# Patient Record
Sex: FEMALE | Race: BLACK OR AFRICAN AMERICAN | NOT HISPANIC OR LATINO
[De-identification: names, ages, dates, MRNs, and addresses within clinical notes are randomized per-mention and may not be internally consistent; named-entity substitution may affect disease eponyms.]

---

## 2017-05-26 PROBLEM — Z00.00 ENCOUNTER FOR PREVENTIVE HEALTH EXAMINATION: Status: ACTIVE | Noted: 2017-05-26

## 2017-06-01 PROBLEM — Z83.3 FAMILY HISTORY OF DIABETES MELLITUS: Status: ACTIVE | Noted: 2017-06-01

## 2017-06-01 PROBLEM — Z82.49 FAMILY HISTORY OF ESSENTIAL HYPERTENSION: Status: ACTIVE | Noted: 2017-06-01

## 2017-06-01 PROBLEM — Z86.79 HISTORY OF ESSENTIAL HYPERTENSION: Status: RESOLVED | Noted: 2017-06-01 | Resolved: 2017-06-01

## 2017-06-01 PROBLEM — E10.51 TYPE 1 DIABETES, UNCONTROLLED, WITH PERIPHERAL CIRCULATORY DISORDER: Status: RESOLVED | Noted: 2017-06-01 | Resolved: 2017-06-01

## 2017-06-01 RX ORDER — METFORMIN HYDROCHLORIDE 1000 MG/1
1000 TABLET, COATED ORAL
Refills: 0 | Status: ACTIVE | COMMUNITY

## 2017-06-01 RX ORDER — CHOLECALCIFEROL (VITAMIN D3) 50 MCG
50 MCG TABLET ORAL
Refills: 0 | Status: ACTIVE | COMMUNITY

## 2017-06-01 RX ORDER — LISINOPRIL AND HYDROCHLOROTHIAZIDE TABLETS 20; 25 MG/1; MG/1
20-25 TABLET ORAL
Refills: 0 | Status: ACTIVE | COMMUNITY

## 2017-06-01 RX ORDER — PRAVASTATIN SODIUM 40 MG/1
TABLET ORAL
Refills: 0 | Status: ACTIVE | COMMUNITY

## 2017-06-02 ENCOUNTER — APPOINTMENT (OUTPATIENT)
Dept: SURGERY | Facility: CLINIC | Age: 82
End: 2017-06-02

## 2017-06-02 VITALS
HEIGHT: 66 IN | HEART RATE: 89 BPM | WEIGHT: 125 LBS | DIASTOLIC BLOOD PRESSURE: 67 MMHG | SYSTOLIC BLOOD PRESSURE: 233 MMHG | BODY MASS INDEX: 20.09 KG/M2

## 2017-06-02 DIAGNOSIS — Z86.79 PERSONAL HISTORY OF OTHER DISEASES OF THE CIRCULATORY SYSTEM: ICD-10-CM

## 2017-06-02 DIAGNOSIS — Z82.49 FAMILY HISTORY OF ISCHEMIC HEART DISEASE AND OTHER DISEASES OF THE CIRCULATORY SYSTEM: ICD-10-CM

## 2017-06-02 DIAGNOSIS — Z83.3 FAMILY HISTORY OF DIABETES MELLITUS: ICD-10-CM

## 2017-06-02 DIAGNOSIS — E10.65 TYPE 1 DIABETES MELLITUS WITH DIABETIC PERIPHERAL ANGIOPATHY W/OUT GANGRENE: ICD-10-CM

## 2017-06-02 DIAGNOSIS — E10.51 TYPE 1 DIABETES MELLITUS WITH DIABETIC PERIPHERAL ANGIOPATHY W/OUT GANGRENE: ICD-10-CM

## 2017-06-09 ENCOUNTER — OUTPATIENT (OUTPATIENT)
Dept: OUTPATIENT SERVICES | Facility: HOSPITAL | Age: 82
LOS: 1 days | End: 2017-06-09
Payer: MEDICARE

## 2017-06-09 VITALS
DIASTOLIC BLOOD PRESSURE: 80 MMHG | OXYGEN SATURATION: 99 % | RESPIRATION RATE: 16 BRPM | HEIGHT: 63 IN | TEMPERATURE: 98 F | WEIGHT: 119.93 LBS | SYSTOLIC BLOOD PRESSURE: 180 MMHG | HEART RATE: 74 BPM

## 2017-06-09 DIAGNOSIS — E11.9 TYPE 2 DIABETES MELLITUS WITHOUT COMPLICATIONS: ICD-10-CM

## 2017-06-09 DIAGNOSIS — R06.83 SNORING: ICD-10-CM

## 2017-06-09 DIAGNOSIS — I10 ESSENTIAL (PRIMARY) HYPERTENSION: ICD-10-CM

## 2017-06-09 DIAGNOSIS — E04.9 NONTOXIC GOITER, UNSPECIFIED: ICD-10-CM

## 2017-06-09 DIAGNOSIS — Z90.5 ACQUIRED ABSENCE OF KIDNEY: Chronic | ICD-10-CM

## 2017-06-09 LAB
ANTIBODY ID 1_1: SIGNIFICANT CHANGE UP
BLD GP AB SCN SERPL QL: POSITIVE — SIGNIFICANT CHANGE UP
BUN SERPL-MCNC: 18 MG/DL — SIGNIFICANT CHANGE UP (ref 7–23)
CALCIUM SERPL-MCNC: 9.9 MG/DL — SIGNIFICANT CHANGE UP (ref 8.4–10.5)
CHLORIDE SERPL-SCNC: 98 MMOL/L — SIGNIFICANT CHANGE UP (ref 98–107)
CO2 SERPL-SCNC: 28 MMOL/L — SIGNIFICANT CHANGE UP (ref 22–31)
CREAT SERPL-MCNC: 1.19 MG/DL — SIGNIFICANT CHANGE UP (ref 0.5–1.3)
DAT POLY-SP REAG RBC QL: NEGATIVE — SIGNIFICANT CHANGE UP
GLUCOSE SERPL-MCNC: 101 MG/DL — HIGH (ref 70–99)
HBA1C BLD-MCNC: 7.8 % — HIGH (ref 4–5.6)
HCT VFR BLD CALC: 39.9 % — SIGNIFICANT CHANGE UP (ref 34.5–45)
HGB BLD-MCNC: 12.6 G/DL — SIGNIFICANT CHANGE UP (ref 11.5–15.5)
MCHC RBC-ENTMCNC: 31.6 % — LOW (ref 32–36)
MCHC RBC-ENTMCNC: 31.8 PG — SIGNIFICANT CHANGE UP (ref 27–34)
MCV RBC AUTO: 100.8 FL — HIGH (ref 80–100)
PLATELET # BLD AUTO: 253 K/UL — SIGNIFICANT CHANGE UP (ref 150–400)
PMV BLD: 11.7 FL — SIGNIFICANT CHANGE UP (ref 7–13)
POTASSIUM SERPL-MCNC: 4.3 MMOL/L — SIGNIFICANT CHANGE UP (ref 3.5–5.3)
POTASSIUM SERPL-SCNC: 4.3 MMOL/L — SIGNIFICANT CHANGE UP (ref 3.5–5.3)
RBC # BLD: 3.96 M/UL — SIGNIFICANT CHANGE UP (ref 3.8–5.2)
RBC # FLD: 15.2 % — HIGH (ref 10.3–14.5)
RH IG SCN BLD-IMP: POSITIVE — SIGNIFICANT CHANGE UP
SODIUM SERPL-SCNC: 140 MMOL/L — SIGNIFICANT CHANGE UP (ref 135–145)
WBC # BLD: 7.38 K/UL — SIGNIFICANT CHANGE UP (ref 3.8–10.5)
WBC # FLD AUTO: 7.38 K/UL — SIGNIFICANT CHANGE UP (ref 3.8–10.5)

## 2017-06-09 PROCEDURE — 86077 PHYS BLOOD BANK SERV XMATCH: CPT

## 2017-06-09 PROCEDURE — 93010 ELECTROCARDIOGRAM REPORT: CPT

## 2017-06-09 NOTE — H&P PST ADULT - NEGATIVE ALLERGY TYPES
no indoor environmental allergies/no reactions to food/no outdoor environmental allergies/no reactions to medicines

## 2017-06-09 NOTE — H&P PST ADULT - NEGATIVE ENMT SYMPTOMS
no post-nasal discharge/no nasal discharge/no ear pain/no vertigo/no nasal obstruction/no tinnitus/no sinus symptoms/no nasal congestion/no hearing difficulty no post-nasal discharge/no dry mouth/no hearing difficulty/no nasal discharge/no nasal obstruction/no sinus symptoms/no nasal congestion/no vertigo/no tinnitus/no ear pain/no throat pain

## 2017-06-09 NOTE — H&P PST ADULT - NEGATIVE MUSCULOSKELETAL SYMPTOMS
no back pain/no neck pain/no muscle weakness/no stiffness/no muscle cramps no neck pain/no back pain/no joint swelling/no muscle weakness/no stiffness/no muscle cramps

## 2017-06-09 NOTE — H&P PST ADULT - NEGATIVE OPHTHALMOLOGIC SYMPTOMS
no diplopia/no blurred vision R/no lacrimation R/no blurred vision L/no lacrimation L/no photophobia

## 2017-06-09 NOTE — H&P PST ADULT - PROBLEM SELECTOR PLAN 2
HbgA1C sent, pending result. Blood glucose - Accuchek ordered stat on admit. Instructed to hold metformin & glipizide PM 6/13/2017 & AM of surgery. Verbalized understanding.

## 2017-06-09 NOTE — H&P PST ADULT - NEGATIVE GENERAL GENITOURINARY SYMPTOMS
no dysuria/no bladder infections/no incontinence/no hematuria/no flank pain R/normal urinary frequency/no flank pain L

## 2017-06-09 NOTE — H&P PST ADULT - PROBLEM SELECTOR PLAN 1
Scheduled for resection total substernal thyroidectomy possible tracheostomy on 6/14/2017. labs done and results pending. Surgical scrub & preop instruction given and explained. Famotidine provided with instruction. Verbalized understanding.

## 2017-06-09 NOTE — H&P PST ADULT - HISTORY OF PRESENT ILLNESS
86 yo female with hx of HTN, DM presents to have PST evaluation for resection total substernal thyroidectomy possible tracheostomy on 6/14/2017. Patient reports hx of goiter x 10 years, which increased in size, causing difficulty swallowing, went for an evaluation, had US done. Patient was sent for further evaluation, seen by Dr. Collado, surgical intervention was recommended. 86 yo female with hx of HTN, DM, kidney disease, s/p left nephrectomy (> 20 years ago), presents to have PST evaluation for resection total substernal thyroidectomy possible tracheostomy on 6/14/2017. Patient reports hx of goiter x 10 years, which increased in size, causing difficulty swallowing, went for an evaluation, had US & CT scan done, which revealed "tracheal deviation". Patient was sent for further evaluation, seen by Dr. Collado, surgical intervention was recommended.

## 2017-06-09 NOTE — H&P PST ADULT - RS GEN PE MLT RESP DETAILS PC
no wheezes/no rhonchi/respirations non-labored/breath sounds equal/good air movement/no rales/airway patent

## 2017-06-09 NOTE — H&P PST ADULT - NSANTHOSAYNRD_GEN_A_CORE
No. DOMINIC screening performed.  STOP BANG Legend: 0-2 = LOW Risk; 3-4 = INTERMEDIATE Risk; 5-8 = HIGH Risk

## 2017-06-09 NOTE — H&P PST ADULT - PROBLEM SELECTOR PLAN 3
Instructed to take losartan-HCTZ AM of surgery with a sip of water. Patient presents with BP ranging from 180/80 to 155/100 while at PST. Patient denies any headache, blurred vision or discomfort. Patient states, she didn't take her bp medicine this AM. Instructed to repeat bp after take medicine- present to PCP if remain elevated or have symtoms of HTN. Patient & Faith (granddaughter) verbalized understanding.   Patient states, she's evaluated by PCP for medical clearance on 6/6/17. Will obtain. Will also obtain comparison EKG & last echocardiogram report. Instructed to take losartan-HCTZ AM of surgery with a sip of water. Patient presents with BP ranging from 180/80 to 155/100 while at PST. Patient denies any headache, blurred vision or discomfort. Patient states, she didn't take her bp medicine this AM. Instructed to repeat bp after take medicine- present to PCP if bp remains elevated or have symptoms of HTN. Patient & Faith (granddaughter) verbalized understanding.   Patient states, she's evaluated by PCP for medical clearance on 6/6/17. Will obtain. Will also obtain comparison EKG. Instructed to take losartan-HCTZ AM of surgery with a sip of water. Patient presents with BP ranging from 180/80 to 155/100 while at PST. Patient denies any headache, blurred vision or discomfort. Patient states, she didn't take her bp medicine this AM. Instructed to repeat bp after take medicine- present to PCP if bp remains elevated or have symptoms of HTN. Patient & Faith (granddaughter) verbalized understanding.   Patient states, she's evaluated by PCP for medical clearance on 6/6/17. Will obtain. Will also obtain comparison EKG & recent echocardiogram report.

## 2017-06-09 NOTE — H&P PST ADULT - ENDOCRINE COMMENTS
monitor glucose x2 /week range 120- 130, hx of goiter monitor glucose x2 /week range 120- 130, hx of goiter/ hx of goiter

## 2017-06-14 ENCOUNTER — INPATIENT (INPATIENT)
Facility: HOSPITAL | Age: 82
LOS: 0 days | Discharge: HOME | End: 2017-06-14
Attending: SURGERY | Admitting: SURGERY

## 2017-06-14 VITALS
TEMPERATURE: 99 F | RESPIRATION RATE: 16 BRPM | HEART RATE: 77 BPM | OXYGEN SATURATION: 98 % | DIASTOLIC BLOOD PRESSURE: 85 MMHG | HEIGHT: 63 IN | SYSTOLIC BLOOD PRESSURE: 196 MMHG | WEIGHT: 119.93 LBS

## 2017-06-14 DIAGNOSIS — E04.9 NONTOXIC GOITER, UNSPECIFIED: ICD-10-CM

## 2017-06-14 DIAGNOSIS — Z90.5 ACQUIRED ABSENCE OF KIDNEY: Chronic | ICD-10-CM

## 2017-06-14 RX ORDER — METFORMIN HYDROCHLORIDE 850 MG/1
1 TABLET ORAL
Qty: 0 | Refills: 0 | COMMUNITY

## 2017-06-14 RX ORDER — SODIUM CHLORIDE 9 MG/ML
1000 INJECTION, SOLUTION INTRAVENOUS
Qty: 0 | Refills: 0 | Status: DISCONTINUED | OUTPATIENT
Start: 2017-06-14 | End: 2017-06-14

## 2017-06-14 NOTE — ASU DISCHARGE PLAN (ADULT/PEDIATRIC). - MEDICATION SUMMARY - MEDICATIONS TO TAKE
I will START or STAY ON the medications listed below when I get home from the hospital:    metFORMIN 500 mg oral tablet  -- 1 tab(s) by mouth 2 times a day  -- Indication: For DM    glipiZIDE 5 mg oral tablet, extended release  --  by mouth 2 times a day  -- Indication: For DM    pravastatin 20 mg oral tablet  -- 1 tab(s) by mouth once a day  -- Indication: For HLD    lisinopril-hydrochlorothiazide 20mg-25mg oral tablet  -- 1 tab(s) by mouth once a day  -- Indication: For HTN

## 2017-06-14 NOTE — ASU DISCHARGE PLAN (ADULT/PEDIATRIC). - INSTRUCTIONS
Dr. Cox's office will contact you today regarding your follow up appointment.    Please keep your follow up appointment to reschedule your surgery.

## 2017-06-14 NOTE — PROGRESS NOTE ADULT - SUBJECTIVE AND OBJECTIVE BOX
The patient's planned total thyroidectomy, possible tracheostomy was cancelled after discussion between Dr. Acosta of anesthesiology and Dr. Collado.  The patient requires cardiology evaluation with TTE and other testing as indicated.  She also requires improved blood pressure control.  Dr. Gen Cox 	[phone] was contacted and he accepted the consultation for outpatient work up and will be contacting the family today to arrange a time to be seen.      Ms. Irwin will be rescheduled after cardiology evaluation is conducted.     Discussed with Dr. Collado and the patient and family.

## 2017-06-14 NOTE — ASU DISCHARGE PLAN (ADULT/PEDIATRIC). - SPECIAL INSTRUCTIONS
Return to your previous home diet and activity.  Continue your medications as prescribed until discussing with Dr. Cox.

## 2017-07-05 RX ORDER — SODIUM CHLORIDE 9 MG/ML
1000 INJECTION, SOLUTION INTRAVENOUS
Qty: 0 | Refills: 0 | Status: DISCONTINUED | OUTPATIENT
Start: 2017-07-06 | End: 2017-07-06

## 2017-07-06 ENCOUNTER — APPOINTMENT (OUTPATIENT)
Dept: SURGERY | Facility: HOSPITAL | Age: 82
End: 2017-07-06

## 2017-07-06 ENCOUNTER — RESULT REVIEW (OUTPATIENT)
Age: 82
End: 2017-07-06

## 2017-07-06 ENCOUNTER — INPATIENT (INPATIENT)
Facility: HOSPITAL | Age: 82
LOS: 0 days | Discharge: HOME | End: 2017-07-07
Attending: SURGERY | Admitting: SURGERY
Payer: MEDICARE

## 2017-07-06 ENCOUNTER — TRANSCRIPTION ENCOUNTER (OUTPATIENT)
Age: 82
End: 2017-07-06

## 2017-07-06 VITALS
WEIGHT: 119.93 LBS | HEIGHT: 63 IN | TEMPERATURE: 98 F | HEART RATE: 70 BPM | DIASTOLIC BLOOD PRESSURE: 75 MMHG | RESPIRATION RATE: 16 BRPM | OXYGEN SATURATION: 98 % | SYSTOLIC BLOOD PRESSURE: 215 MMHG

## 2017-07-06 DIAGNOSIS — Z90.5 ACQUIRED ABSENCE OF KIDNEY: Chronic | ICD-10-CM

## 2017-07-06 DIAGNOSIS — E04.9 NONTOXIC GOITER, UNSPECIFIED: ICD-10-CM

## 2017-07-06 LAB
ANTIBODY ID 1_1: SIGNIFICANT CHANGE UP
BASE EXCESS BLDA CALC-SCNC: 2 MMOL/L — SIGNIFICANT CHANGE UP
BLD GP AB SCN SERPL QL: POSITIVE — SIGNIFICANT CHANGE UP
CA-I BLDA-SCNC: 1.19 MMOL/L — SIGNIFICANT CHANGE UP (ref 1.15–1.29)
DAT POLY-SP REAG RBC QL: NEGATIVE — SIGNIFICANT CHANGE UP
GLUCOSE BLDA-MCNC: 96 MG/DL — SIGNIFICANT CHANGE UP (ref 70–99)
HCO3 BLDA-SCNC: 26 MMOL/L — SIGNIFICANT CHANGE UP (ref 22–26)
HCT VFR BLDA CALC: 33.2 % — LOW (ref 34.5–46.5)
HGB BLDA-MCNC: 10.8 G/DL — LOW (ref 11.5–15.5)
PCO2 BLDA: 39 MMHG — SIGNIFICANT CHANGE UP (ref 32–48)
PH BLDA: 7.44 PH — SIGNIFICANT CHANGE UP (ref 7.35–7.45)
PO2 BLDA: 336 MMHG — HIGH (ref 83–108)
POTASSIUM BLDA-SCNC: 3.4 MMOL/L — SIGNIFICANT CHANGE UP (ref 3.4–4.5)
RH IG SCN BLD-IMP: POSITIVE — SIGNIFICANT CHANGE UP
SAO2 % BLDA: 100 % — HIGH (ref 95–99)
SODIUM BLDA-SCNC: 138 MMOL/L — SIGNIFICANT CHANGE UP (ref 136–146)

## 2017-07-06 PROCEDURE — 60240 REMOVAL OF THYROID: CPT

## 2017-07-06 PROCEDURE — 88307 TISSUE EXAM BY PATHOLOGIST: CPT | Mod: 26

## 2017-07-06 PROCEDURE — 86077 PHYS BLOOD BANK SERV XMATCH: CPT

## 2017-07-06 RX ORDER — INSULIN LISPRO 100/ML
VIAL (ML) SUBCUTANEOUS AT BEDTIME
Qty: 0 | Refills: 0 | Status: DISCONTINUED | OUTPATIENT
Start: 2017-07-06 | End: 2017-07-07

## 2017-07-06 RX ORDER — SODIUM CHLORIDE 9 MG/ML
1000 INJECTION, SOLUTION INTRAVENOUS
Qty: 0 | Refills: 0 | Status: DISCONTINUED | OUTPATIENT
Start: 2017-07-06 | End: 2017-07-07

## 2017-07-06 RX ORDER — ACETAMINOPHEN 500 MG
650 TABLET ORAL EVERY 6 HOURS
Qty: 0 | Refills: 0 | Status: DISCONTINUED | OUTPATIENT
Start: 2017-07-06 | End: 2017-07-07

## 2017-07-06 RX ORDER — HEPARIN SODIUM 5000 [USP'U]/ML
5000 INJECTION INTRAVENOUS; SUBCUTANEOUS EVERY 8 HOURS
Qty: 0 | Refills: 0 | Status: DISCONTINUED | OUTPATIENT
Start: 2017-07-06 | End: 2017-07-07

## 2017-07-06 RX ORDER — DEXTROSE 50 % IN WATER 50 %
12.5 SYRINGE (ML) INTRAVENOUS ONCE
Qty: 0 | Refills: 0 | Status: DISCONTINUED | OUTPATIENT
Start: 2017-07-06 | End: 2017-07-07

## 2017-07-06 RX ORDER — GLUCAGON INJECTION, SOLUTION 0.5 MG/.1ML
1 INJECTION, SOLUTION SUBCUTANEOUS ONCE
Qty: 0 | Refills: 0 | Status: DISCONTINUED | OUTPATIENT
Start: 2017-07-06 | End: 2017-07-07

## 2017-07-06 RX ORDER — DEXTROSE 50 % IN WATER 50 %
1 SYRINGE (ML) INTRAVENOUS ONCE
Qty: 0 | Refills: 0 | Status: DISCONTINUED | OUTPATIENT
Start: 2017-07-06 | End: 2017-07-07

## 2017-07-06 RX ORDER — LISINOPRIL 2.5 MG/1
20 TABLET ORAL DAILY
Qty: 0 | Refills: 0 | Status: DISCONTINUED | OUTPATIENT
Start: 2017-07-06 | End: 2017-07-07

## 2017-07-06 RX ORDER — OXYCODONE AND ACETAMINOPHEN 5; 325 MG/1; MG/1
1 TABLET ORAL EVERY 6 HOURS
Qty: 0 | Refills: 0 | Status: DISCONTINUED | OUTPATIENT
Start: 2017-07-06 | End: 2017-07-07

## 2017-07-06 RX ORDER — IBUPROFEN 200 MG
600 TABLET ORAL EVERY 6 HOURS
Qty: 0 | Refills: 0 | Status: DISCONTINUED | OUTPATIENT
Start: 2017-07-06 | End: 2017-07-07

## 2017-07-06 RX ORDER — DEXTROSE 50 % IN WATER 50 %
50 SYRINGE (ML) INTRAVENOUS ONCE
Qty: 0 | Refills: 0 | Status: COMPLETED | OUTPATIENT
Start: 2017-07-06 | End: 2017-07-06

## 2017-07-06 RX ORDER — HYDROCHLOROTHIAZIDE 25 MG
25 TABLET ORAL DAILY
Qty: 0 | Refills: 0 | Status: DISCONTINUED | OUTPATIENT
Start: 2017-07-06 | End: 2017-07-07

## 2017-07-06 RX ORDER — DEXTROSE 50 % IN WATER 50 %
25 SYRINGE (ML) INTRAVENOUS ONCE
Qty: 0 | Refills: 0 | Status: DISCONTINUED | OUTPATIENT
Start: 2017-07-06 | End: 2017-07-07

## 2017-07-06 RX ORDER — INSULIN LISPRO 100/ML
VIAL (ML) SUBCUTANEOUS
Qty: 0 | Refills: 0 | Status: DISCONTINUED | OUTPATIENT
Start: 2017-07-06 | End: 2017-07-07

## 2017-07-06 RX ADMIN — SODIUM CHLORIDE 30 MILLILITER(S): 9 INJECTION, SOLUTION INTRAVENOUS at 08:09

## 2017-07-06 RX ADMIN — SODIUM CHLORIDE 75 MILLILITER(S): 9 INJECTION, SOLUTION INTRAVENOUS at 19:11

## 2017-07-06 RX ADMIN — Medication 600 MILLIGRAM(S): at 20:32

## 2017-07-06 RX ADMIN — Medication 50 MILLILITER(S): at 07:55

## 2017-07-06 RX ADMIN — HEPARIN SODIUM 5000 UNIT(S): 5000 INJECTION INTRAVENOUS; SUBCUTANEOUS at 22:09

## 2017-07-06 RX ADMIN — Medication: at 19:18

## 2017-07-06 RX ADMIN — Medication 600 MILLIGRAM(S): at 20:02

## 2017-07-06 NOTE — CHART NOTE - NSCHARTNOTEFT_GEN_A_CORE
Post-Op Check    S: Patient resting in bed. Pain well controlled. No acute events    T(C): 36.6 (07-06-17 @ 15:00), Max: 36.9 (07-06-17 @ 13:05)  HR: 68 (07-06-17 @ 16:00) (62 - 70)  BP: 159/54 (07-06-17 @ 16:00) (131/45 - 215/75)  RR: 23 (07-06-17 @ 16:00) (14 - 23)  SpO2: 100% (07-06-17 @ 16:00) (98% - 100%)  Wt(kg): --    07-06 @ 07:01  -  07-06 @ 19:37  --------------------------------------------------------  IN:    Oral Fluid: 120 mL    Solution: 225 mL  Total IN: 345 mL    OUT:    Voided: 600 mL  Total OUT: 600 mL    Total NET: -255 mL    CAPILLARY BLOOD GLUCOSE  145 (06 Jul 2017 13:15)  138 (06 Jul 2017 08:16)  50 (06 Jul 2017 06:58)      General: WN/WD NAD  Neurology: A&Ox3, follows commands  Eyes: EOMI  ENT/Neck: No JVD, incision dressing covered with dressing with minimal sanguinous stain  Respiratory: CTA B/L, No wheezing, rales, rhonchi  CV: Normal rate regular rhythm, S1S2, 2/6 systolic ejection murmur best heard at apex  Abdominal: Soft, NT, ND +BS,

## 2017-07-06 NOTE — PROVIDER CONTACT NOTE (OTHER) - RECOMMENDATIONS
paged anesthesia dr. harris, asked to call team spoke to general surgery dr. austin one amp of d 50 ordered

## 2017-07-06 NOTE — DOWNTIME INTERRUPTION NOTE - WHICH MANUAL FORMS INITIATED?
Vital Signs & pain management flowsheet Vital Signs & pain management flowsheet  Assessment & Intervention

## 2017-07-07 ENCOUNTER — TRANSCRIPTION ENCOUNTER (OUTPATIENT)
Age: 82
End: 2017-07-07

## 2017-07-07 VITALS
OXYGEN SATURATION: 100 % | TEMPERATURE: 98 F | DIASTOLIC BLOOD PRESSURE: 55 MMHG | RESPIRATION RATE: 20 BRPM | SYSTOLIC BLOOD PRESSURE: 145 MMHG | HEART RATE: 72 BPM

## 2017-07-07 LAB
BUN SERPL-MCNC: 17 MG/DL — SIGNIFICANT CHANGE UP (ref 7–23)
CA-I BLD-SCNC: 1.14 MMOL/L — SIGNIFICANT CHANGE UP (ref 1.03–1.23)
CALCIUM SERPL-MCNC: 8.6 MG/DL — SIGNIFICANT CHANGE UP (ref 8.4–10.5)
CHLORIDE SERPL-SCNC: 96 MMOL/L — LOW (ref 98–107)
CO2 SERPL-SCNC: 30 MMOL/L — SIGNIFICANT CHANGE UP (ref 22–31)
CREAT SERPL-MCNC: 1.02 MG/DL — SIGNIFICANT CHANGE UP (ref 0.5–1.3)
GLUCOSE SERPL-MCNC: 173 MG/DL — HIGH (ref 70–99)
HCT VFR BLD CALC: 30.7 % — LOW (ref 34.5–45)
HCT VFR BLD CALC: 30.8 % — LOW (ref 34.5–45)
HGB BLD-MCNC: 10.1 G/DL — LOW (ref 11.5–15.5)
HGB BLD-MCNC: 10.2 G/DL — LOW (ref 11.5–15.5)
MAGNESIUM SERPL-MCNC: 1.7 MG/DL — SIGNIFICANT CHANGE UP (ref 1.6–2.6)
MCHC RBC-ENTMCNC: 32.4 PG — SIGNIFICANT CHANGE UP (ref 27–34)
MCHC RBC-ENTMCNC: 32.6 PG — SIGNIFICANT CHANGE UP (ref 27–34)
MCHC RBC-ENTMCNC: 32.9 % — SIGNIFICANT CHANGE UP (ref 32–36)
MCHC RBC-ENTMCNC: 33.1 % — SIGNIFICANT CHANGE UP (ref 32–36)
MCV RBC AUTO: 98.4 FL — SIGNIFICANT CHANGE UP (ref 80–100)
MCV RBC AUTO: 98.4 FL — SIGNIFICANT CHANGE UP (ref 80–100)
NRBC # FLD: 0 — SIGNIFICANT CHANGE UP
NRBC # FLD: 0 — SIGNIFICANT CHANGE UP
PHOSPHATE SERPL-MCNC: 3.7 MG/DL — SIGNIFICANT CHANGE UP (ref 2.5–4.5)
PLATELET # BLD AUTO: 196 K/UL — SIGNIFICANT CHANGE UP (ref 150–400)
PLATELET # BLD AUTO: 199 K/UL — SIGNIFICANT CHANGE UP (ref 150–400)
PMV BLD: 10.8 FL — SIGNIFICANT CHANGE UP (ref 7–13)
PMV BLD: 11.2 FL — SIGNIFICANT CHANGE UP (ref 7–13)
POTASSIUM SERPL-MCNC: 4.6 MMOL/L — SIGNIFICANT CHANGE UP (ref 3.5–5.3)
POTASSIUM SERPL-SCNC: 4.6 MMOL/L — SIGNIFICANT CHANGE UP (ref 3.5–5.3)
RBC # BLD: 3.12 M/UL — LOW (ref 3.8–5.2)
RBC # BLD: 3.13 M/UL — LOW (ref 3.8–5.2)
RBC # FLD: 15 % — HIGH (ref 10.3–14.5)
RBC # FLD: 15.2 % — HIGH (ref 10.3–14.5)
RH IG SCN BLD-IMP: POSITIVE — SIGNIFICANT CHANGE UP
SODIUM SERPL-SCNC: 136 MMOL/L — SIGNIFICANT CHANGE UP (ref 135–145)
WBC # BLD: 9.21 K/UL — SIGNIFICANT CHANGE UP (ref 3.8–10.5)
WBC # BLD: 9.32 K/UL — SIGNIFICANT CHANGE UP (ref 3.8–10.5)
WBC # FLD AUTO: 9.21 K/UL — SIGNIFICANT CHANGE UP (ref 3.8–10.5)
WBC # FLD AUTO: 9.32 K/UL — SIGNIFICANT CHANGE UP (ref 3.8–10.5)

## 2017-07-07 RX ORDER — IBUPROFEN 200 MG
1 TABLET ORAL
Qty: 0 | Refills: 0 | COMMUNITY
Start: 2017-07-07

## 2017-07-07 RX ORDER — ACETAMINOPHEN 500 MG
2 TABLET ORAL
Qty: 0 | Refills: 0 | COMMUNITY
Start: 2017-07-07

## 2017-07-07 RX ORDER — MAGNESIUM SULFATE 500 MG/ML
2 VIAL (ML) INJECTION ONCE
Qty: 0 | Refills: 0 | Status: COMPLETED | OUTPATIENT
Start: 2017-07-07 | End: 2017-07-07

## 2017-07-07 RX ORDER — SODIUM CHLORIDE 9 MG/ML
1000 INJECTION, SOLUTION INTRAVENOUS
Qty: 0 | Refills: 0 | Status: DISCONTINUED | OUTPATIENT
Start: 2017-07-07 | End: 2017-07-07

## 2017-07-07 RX ORDER — SODIUM CHLORIDE 9 MG/ML
3 INJECTION INTRAMUSCULAR; INTRAVENOUS; SUBCUTANEOUS EVERY 8 HOURS
Qty: 0 | Refills: 0 | Status: DISCONTINUED | OUTPATIENT
Start: 2017-07-07 | End: 2017-07-07

## 2017-07-07 RX ADMIN — LISINOPRIL 20 MILLIGRAM(S): 2.5 TABLET ORAL at 05:30

## 2017-07-07 RX ADMIN — Medication 50 GRAM(S): at 10:26

## 2017-07-07 RX ADMIN — Medication 25 MILLIGRAM(S): at 05:30

## 2017-07-07 RX ADMIN — HEPARIN SODIUM 5000 UNIT(S): 5000 INJECTION INTRAVENOUS; SUBCUTANEOUS at 05:30

## 2017-07-07 NOTE — PROGRESS NOTE ADULT - SUBJECTIVE AND OBJECTIVE BOX
D Team Progress Note    S: Patient resting in bed. Pain well controlled. Denies sore throat or changes in voice. Denies SOB/CP.     T(C): 37 (07-07-17 @ 05:27), Max: 37 (07-06-17 @ 16:45)  HR: 71 (07-07-17 @ 05:27) (62 - 74)  BP: 124/47 (07-07-17 @ 05:27) (122/46 - 198/72)  RR: 17 (07-07-17 @ 05:27) (14 - 23)  SpO2: 99% (07-07-17 @ 05:27) (98% - 100%)  Wt(kg): --    07-06 @ 07:01  -  07-07 @ 07:00  --------------------------------------------------------  IN:    lactated ringers.: 975 mL    Oral Fluid: 120 mL    Solution: 225 mL  Total IN: 1320 mL    OUT:    Voided: 600 mL  Total OUT: 600 mL    Total NET: 720 mL    CAPILLARY BLOOD GLUCOSE  245 (06 Jul 2017 21:28)  259 (06 Jul 2017 16:45)  145 (06 Jul 2017 13:15)  138 (06 Jul 2017 08:16)      PE:   Gen: AAOX 3, NAD  Neck: Dressing intact, Incision C,D,I, slight tenderness to palpation over incision site.

## 2017-07-07 NOTE — DISCHARGE NOTE ADULT - PATIENT PORTAL LINK FT
“You can access the [employer] Patient Portal, offered by [facility], by registering with the following website: [email or web address]”

## 2017-07-07 NOTE — DISCHARGE NOTE ADULT - INSTRUCTIONS
Shower daily, wash incisions with mild soap and water, pat dry. No lotion , creams, or powder on incisions. Check incisions daily for signs of infection, redness, swelling drainage or temp greater than 101.0, CALL MD. No heavy lifting greater than 10-15 lbs. No driving if taking narcotics or until cleared by MD. Keep all follow up appointments and take all medications as prescribed.

## 2017-07-07 NOTE — DISCHARGE NOTE ADULT - CARE PROVIDER_API CALL
Nilesh Collado), Surgery  [address] [place] [address]  Phone: [phone]  Fax: [phone]

## 2017-07-07 NOTE — DISCHARGE NOTE ADULT - MEDICATION SUMMARY - MEDICATIONS TO TAKE
I will START or STAY ON the medications listed below when I get home from the hospital:    acetaminophen 325 mg oral tablet  -- 2 tab(s) by mouth every 6 hours, As Needed  -- Indication: For pain medication    ibuprofen 600 mg oral tablet  -- 1 tab(s) by mouth every 6 hours, As Needed  -- Indication: For pain medication    metFORMIN 500 mg oral tablet  -- 1 tab(s) by mouth 2 times a day  -- Indication: For DM medication    glipiZIDE 5 mg oral tablet, extended release  --  by mouth 2 times a day  -- Indication: For DM medication    pravastatin 20 mg oral tablet  -- 1 tab(s) by mouth once a day  -- Indication: For cardiovascular medication    lisinopril-hydrochlorothiazide 20mg-25mg oral tablet  -- 1 tab(s) by mouth once a day  -- Indication: For cardiovascular medication

## 2017-07-07 NOTE — PROGRESS NOTE ADULT - ASSESSMENT
A/P: 87yFemale s/p total substernal thyroidectomy  - encourage OOB/IS  - SCD/ DVT PPX  - monitor labs, replete electrolytes as needed  - Pain control  - Reg diet  - Dispo: D/C home    Kyler Roper PGY1

## 2017-07-07 NOTE — DISCHARGE NOTE ADULT - CARE PLAN
Principal Discharge DX:	Goiter  Goal:	s/p left subtotal thyroidectomy  Instructions for follow-up, activity and diet:	WOUND CARE:  please keep incision clean and dry, do not scrub, pat dry when wet  BATHING: Please do not submerge wound underwater. You may shower and/or sponge bathe.  ACTIVITY: No heavy lifting or straining. Otherwise, you may return to your usual level of physical activity. If you are taking narcotic pain medication (such as Percocet) DO NOT drive a car, operate machinery or make important decisions.  DIET: Return to your usual diabetic/cardiac diet.  NOTIFY YOUR SURGEON IF: You have any bleeding that does not stop, any pus draining from your wound(s), any fever (over 100.4 F) or chills, persistent nausea/vomiting, persistent diarrhea, or if your pain is not controlled on your discharge pain medications.  FOLLOW-UP: Please follow up with your primary care physician in one week regarding your hospitalization  Please f/u with Dr Collado as an outpatient, please call  [phone] to schedule appointment  Secondary Diagnosis:	Diabetes mellitus  Instructions for follow-up, activity and diet:	please monitor your sugar levels, continue medications, and f/u with your PCP/Endocrinologist as an outpatient  Secondary Diagnosis:	Hypertension  Instructions for follow-up, activity and diet:	please cont your home medications and f/u with your PCP/Cardiologist as an outpatient Principal Discharge DX:	Goiter  Goal:	s/p left subtotal thyroidectomy  Instructions for follow-up, activity and diet:	WOUND CARE:  please keep incision clean and dry, do not scrub, pat dry when wet  BATHING: Please do not submerge wound underwater. You may shower and/or sponge bathe.  ACTIVITY: No heavy lifting or straining. Otherwise, you may return to your usual level of physical activity. If you are taking narcotic pain medication (such as Percocet) DO NOT drive a car, operate machinery or make important decisions.  DIET: Return to your usual diabetic/cardiac diet.  NOTIFY YOUR SURGEON IF: You have any bleeding that does not stop, any pus draining from your wound(s), any fever (over 100.4 F) or chills, persistent nausea/vomiting, persistent diarrhea, or if your pain is not controlled on your discharge pain medications.  FOLLOW-UP: Please follow up with your primary care physician in one week regarding your hospitalization  Please f/u with Dr Collado as an outpatient, please call  [phone] to schedule appointment  Secondary Diagnosis:	Diabetes mellitus  Instructions for follow-up, activity and diet:	please monitor your sugar levels, continue medications, and f/u with your PCP/Endocrinologist as an outpatient  Secondary Diagnosis:	Hypertension  Instructions for follow-up, activity and diet:	please cont your home medications and f/u with your PCP/Cardiologist as an outpatient Principal Discharge DX:	Goiter  Goal:	s/p left subtotal thyroidectomy  Instructions for follow-up, activity and diet:	WOUND CARE:  please keep incision clean and dry, do not scrub, pat dry when wet  BATHING: Please do not submerge wound underwater. You may shower and/or sponge bathe.  ACTIVITY: No heavy lifting or straining. Otherwise, you may return to your usual level of physical activity. If you are taking narcotic pain medication (such as Percocet) DO NOT drive a car, operate machinery or make important decisions.  DIET: Return to your usual diabetic/cardiac diet.  NOTIFY YOUR SURGEON IF: You have any bleeding that does not stop, any pus draining from your wound(s), any fever (over 100.4 F) or chills, persistent nausea/vomiting, persistent diarrhea, or if your pain is not controlled on your discharge pain medications.  FOLLOW-UP: Please follow up with your primary care physician in one week regarding your hospitalization  Please f/u with Dr Collado as an outpatient, please call  [phone] to schedule appointment  Secondary Diagnosis:	Diabetes mellitus  Instructions for follow-up, activity and diet:	please monitor your sugar levels, continue medications, and f/u with your PCP/Endocrinologist as an outpatient  Secondary Diagnosis:	Hypertension  Instructions for follow-up, activity and diet:	please cont your home medications and f/u with your PCP/Cardiologist as an outpatient

## 2017-07-07 NOTE — DISCHARGE NOTE ADULT - HOSPITAL COURSE
88 yo female with hx of HTN, DM, kidney disease, s/p left nephrectomy (> 20 years ago). Pt reports hx of goiter x 10 years, which increased in size, causing difficulty swallowing, went for an evaluation, had US & CT scan done, which revealed "tracheal deviation". Patient was sent for further evaluation, seen by Dr. Collado, surgical intervention was recommended. 86 yo female with hx of HTN, DM, kidney disease, s/p left nephrectomy (> 20 years ago). Pt reports hx of goiter x 10 years, which increased in size, causing difficulty swallowing, went for an evaluation, had US & CT scan done, which revealed "tracheal deviation". Patient was sent for further evaluation, seen by Dr. Collado, surgical intervention was recommended.     6/24 pt arrived for scheduled total thyroidectomy, possible tracheostomy but case was cancelled due to need for cardiology evaluation. Pt was discharged and followed up with her Cardiologist for optimization prior to the OR.    7/6 pt same day admission and underwent left subtotal thyroidectomy. Pt tolerated procedure well.    Post operative course uncomplicated. Pt's diet advanced as tolerated. 88 yo female with hx of HTN, DM, kidney disease, s/p left nephrectomy (> 20 years ago). Pt reports hx of goiter x 10 years, which increased in size, causing difficulty swallowing, went for an evaluation, had US & CT scan done, which revealed "tracheal deviation". Patient was sent for further evaluation, seen by Dr. Collado, surgical intervention was recommended.     6/24 pt arrived for scheduled total thyroidectomy, possible tracheostomy but case was cancelled due to need for cardiology evaluation. Pt was discharged and followed up with her Cardiologist for optimization prior to the OR.    7/6 pt same day admission and underwent left subtotal thyroidectomy. Pt tolerated procedure well.    Post operative course uncomplicated. Pt's diet advanced as tolerated.    Per Attending pt stable for discharge home. Pt tolerating diet and pain well controlled. Pt to f/u with Dr Collado as an outpatient, instructed to call to schedule appointment 88 yo female with hx of HTN, DM, kidney disease, s/p left nephrectomy (> 20 years ago). Pt reports hx of goiter x 10 years, which increased in size, causing difficulty swallowing, went for an evaluation, had US & CT scan done, which revealed "tracheal deviation". Patient was sent for further evaluation, seen by Dr. Collado, surgical intervention was recommended.     6/24 pt arrived for scheduled total thyroidectomy, possible tracheostomy but case was cancelled due to need for cardiology evaluation with TTE and other testing as indicated.  She also required improved blood pressure control.  Dr. Gen Cox was contacted and accepted the consultation for outpatient work up. Pt was discharged and  optimization prior to the rescheduling procedure.    7/6 pt same day admission and underwent left subtotal thyroidectomy. Pt tolerated procedure well.    Post operative course uncomplicated. Pt's diet advanced as tolerated.    Per Attending pt stable for discharge home. Pt tolerating diet and pain well controlled. Pt to f/u with Dr Collado as an outpatient, instructed to call to schedule appointment

## 2017-07-07 NOTE — DISCHARGE NOTE ADULT - PLAN OF CARE
s/p left subtotal thyroidectomy WOUND CARE:  please keep incision clean and dry, do not scrub, pat dry when wet  BATHING: Please do not submerge wound underwater. You may shower and/or sponge bathe.  ACTIVITY: No heavy lifting or straining. Otherwise, you may return to your usual level of physical activity. If you are taking narcotic pain medication (such as Percocet) DO NOT drive a car, operate machinery or make important decisions.  DIET: Return to your usual diabetic/cardiac diet.  NOTIFY YOUR SURGEON IF: You have any bleeding that does not stop, any pus draining from your wound(s), any fever (over 100.4 F) or chills, persistent nausea/vomiting, persistent diarrhea, or if your pain is not controlled on your discharge pain medications.  FOLLOW-UP: Please follow up with your primary care physician in one week regarding your hospitalization  Please f/u with Dr Collado as an outpatient, please call  [phone] to schedule appointment please monitor your sugar levels, continue medications, and f/u with your PCP/Endocrinologist as an outpatient please cont your home medications and f/u with your PCP/Cardiologist as an outpatient

## 2017-07-11 RX ORDER — GLIPIZIDE 5 MG/1
5 TABLET ORAL
Refills: 0 | Status: ACTIVE | COMMUNITY

## 2017-07-14 ENCOUNTER — APPOINTMENT (OUTPATIENT)
Dept: SURGERY | Facility: CLINIC | Age: 82
End: 2017-07-14

## 2017-07-14 DIAGNOSIS — E04.9 NONTOXIC GOITER, UNSPECIFIED: ICD-10-CM

## 2017-07-14 DIAGNOSIS — J39.8 OTHER SPECIFIED DISEASES OF UPPER RESPIRATORY TRACT: ICD-10-CM

## 2017-07-14 DIAGNOSIS — R13.19 OTHER DYSPHAGIA: ICD-10-CM

## 2017-07-30 ENCOUNTER — EMERGENCY (EMERGENCY)
Facility: HOSPITAL | Age: 82
LOS: 1 days | Discharge: HOME | End: 2017-07-30
Attending: EMERGENCY MEDICINE | Admitting: EMERGENCY MEDICINE
Payer: MEDICARE

## 2017-07-30 VITALS
SYSTOLIC BLOOD PRESSURE: 197 MMHG | RESPIRATION RATE: 18 BRPM | TEMPERATURE: 99 F | OXYGEN SATURATION: 99 % | DIASTOLIC BLOOD PRESSURE: 94 MMHG | HEART RATE: 72 BPM

## 2017-07-30 VITALS
OXYGEN SATURATION: 99 % | SYSTOLIC BLOOD PRESSURE: 243 MMHG | HEART RATE: 66 BPM | TEMPERATURE: 98 F | RESPIRATION RATE: 16 BRPM | DIASTOLIC BLOOD PRESSURE: 73 MMHG

## 2017-07-30 DIAGNOSIS — Z90.5 ACQUIRED ABSENCE OF KIDNEY: Chronic | ICD-10-CM

## 2017-07-30 PROCEDURE — 99284 EMERGENCY DEPT VISIT MOD MDM: CPT | Mod: GC

## 2017-07-30 RX ORDER — LISINOPRIL 2.5 MG/1
20 TABLET ORAL ONCE
Qty: 0 | Refills: 0 | Status: COMPLETED | OUTPATIENT
Start: 2017-07-30 | End: 2017-07-30

## 2017-07-30 RX ADMIN — LISINOPRIL 20 MILLIGRAM(S): 2.5 TABLET ORAL at 19:22

## 2017-07-30 NOTE — CONSULT NOTE ADULT - ASSESSMENT
Assessment/Plan: 87y Female presents with transient difficulty swallowing following a left thyroidectomy 3 weeks ago. Symptom was not reproducible in the ED. Was able to swallow for exam.     - No surgical intervention needed at this time  - Please follow up with Dr. Collado this week  - Instructed patient and her children to make an office appointment as soon as possible    Discussed with Dr. Collado  Pager [phone]

## 2017-07-30 NOTE — ED PROVIDER NOTE - ATTENDING CONTRIBUTION TO CARE
I, Rosalinda Mahmood M.D. have examined the patient and confirmed the essential components of the history, physical examination, diagnosis, and treatment plan. I agree with the patient's care as documented by the resident and amended herein by me. See note above for complete details of service.  88 yo F Hx HTN, DM, CKD s/p remote L nephrectomy, S/P Thyroidectomy on 7/6/17 who was BIB family for eval after an episode of dysphagia PTA. Pt was attempting to swallow liquid when she spit it out. Since then no repeat episodes. Pt tolerating po and taking her home meds administered by family in ED without difficulty. Neuro intact. Oropharynx clear. Episode appears to have been self-limited and pt currently at baseline. Plan - contact surgery for further recs, supportive measures, reassess.

## 2017-07-30 NOTE — ED PROVIDER NOTE - MEDICAL DECISION MAKING DETAILS
86yo female with recent thyroidectomy p/w episode of difficulty swallowing, able to swallow in ED, will d/w ENT, but no distress in ED. 88yo female with recent thyroidectomy p/w episode of difficulty swallowing, able to swallow in ED, will d/w surgery, but no distress in ED, no signs of allergic reaction.

## 2017-07-30 NOTE — CONSULT NOTE ADULT - SUBJECTIVE AND OBJECTIVE BOX
General Surgery Consult Note / B Team  Pager [phone]    HPI:  86yo female with goiter s/p left thyroidectomy 3 weeks ago with Dr. Collado presents with an episode of difficulty swallowing today. Per the patient and her family, she was able to eat breakfast this morning, and then could not swallow her saliva later in the day. No coughing or choking. Upon arrival to the ED, she was able to drink water without any issues. Patient last saw Dr. Collado 2 weeks ago for a postoperative check, and no abnormalities were noted. No shortness of breath, no difficulty breathing.    PAST MEDICAL & SURGICAL HISTORY:  Kidney disease: s/p left nephrectomy; 20 years ago  Diabetes mellitus  Hypertension  S/p left thyroidectomy    MEDICATIONS:  metformin  glipizide  lisinopril  pravastatin     ALLERGIES:  NKDA    SOCIAL HISTORY:  Accompanied by children. .  ___________________________________________  Review of Systems:  Constitutional: No fevers, chills, no recent weight loss  ENMT: See HPI  Respiratory: No shortness of breath  Cardiovascular: No chest pain, palpitations  Gastrointestinal: No abdominal pain, no diarrhea/constipation  Genitourinary: No dysuria, frequency, or urgency    Extremities: No joint swelling, no limited range of movement  Neurological: No paresthesia  Skin: No rashes  ___________________________________________  Vital Signs Last 24 Hrs  T(C): 37.1 (30 Jul 2017 16:43), Max: 37.1 (30 Jul 2017 16:43)  T(F): 98.8 (30 Jul 2017 16:43), Max: 98.8 (30 Jul 2017 16:43)  HR: 72 (30 Jul 2017 16:43) (72 - 72)  BP: 197/94 (30 Jul 2017 16:43) (197/94 - 197/94)  BP(mean): --  RR: 18 (30 Jul 2017 16:43) (18 - 18)  SpO2: 99% (30 Jul 2017 16:43) (99% - 99%)CAPILLARY BLOOD GLUCOSE    General: No acute distress, sitting upright in bed  Throat: Steristrips over incision intact. Incision non-erythematous, no drainage. Neck soft, no palpable collections. Movement of right thyroid palpated upon swallowing of water.  Chest: Breathing non-labored  Cardiovascular: RRR  Extremities: Non-edematous

## 2017-07-30 NOTE — ED PROVIDER NOTE - PLAN OF CARE
1) Follow up with your doctor in 1-2 days and with Dr. Collado  2) Return to the ER for worsening or concerning symptoms

## 2017-07-30 NOTE — ED ADULT NURSE NOTE - OBJECTIVE STATEMENT
A&Ox3, respirations even and unlabored, skin warm and dry and intact, steri-strips over thyroidectomy incision, no redness, swelling or discharge noted, patient denies pain, patient able to speak without difficulty or pain, ambulates at baseline with cane. As per daughter, patient had partial thyroidectomy on July 6th 2017 due to swollen thyroid glands causing difficulty swallowing, patient symptoms improving. Today patient unable to swallow food, did not take at home medication. Patient denies fevers, chill, SOB, chest pain. Las fall 12/2016, slip and fall, no head injuries.

## 2017-07-30 NOTE — ED PROVIDER NOTE - PROGRESS NOTE DETAILS
Vickie: seen by surgery, no difficulty swallowing in ED. OK for dc and follow up with week with Dr Collado

## 2017-07-30 NOTE — ED ADULT TRIAGE NOTE - CHIEF COMPLAINT QUOTE
s/p thyroid removed 7/6/17.  c/o difficulty swallowing today. denies sore throat.   airway patent. no stridor noted.

## 2017-07-30 NOTE — ED PROVIDER NOTE - OBJECTIVE STATEMENT
88 yo female with hx of HTN, DM, kidney disease, s/p left nephrectomy (> 20 years ago), goiter s/p thyroidectomy on 7/6 presents with episode of being unable to swallow. Pt tried to drink something and couldn't swallow, spit up liquid. No difficulty managing secretions, no respiratory difficulty, no voice changes, no nausea, vomiting, abdominal pain, lip swelling, tongue swelling.

## 2017-07-30 NOTE — ED PROVIDER NOTE - CARE PLAN
Principal Discharge DX:	Difficulty swallowing  Instructions for follow-up, activity and diet:	1) Follow up with your doctor in 1-2 days and with Dr. Collado  2) Return to the ER for worsening or concerning symptoms

## 2017-07-30 NOTE — ED PROVIDER NOTE - ENMT, MLM
Airway patent, Nasal mucosa clear. Mouth with normal mucosa. Throat has no vesicles, no oropharyngeal exudates and uvula is midline. Drank cup of water with no difficulty

## 2017-11-25 ENCOUNTER — EMERGENCY (EMERGENCY)
Facility: HOSPITAL | Age: 82
LOS: 1 days | Discharge: HOME | End: 2017-11-25
Attending: EMERGENCY MEDICINE | Admitting: EMERGENCY MEDICINE
Payer: MEDICARE

## 2017-11-25 VITALS
HEART RATE: 70 BPM | DIASTOLIC BLOOD PRESSURE: 71 MMHG | TEMPERATURE: 98 F | OXYGEN SATURATION: 100 % | SYSTOLIC BLOOD PRESSURE: 189 MMHG | RESPIRATION RATE: 16 BRPM

## 2017-11-25 VITALS
DIASTOLIC BLOOD PRESSURE: 98 MMHG | OXYGEN SATURATION: 100 % | SYSTOLIC BLOOD PRESSURE: 207 MMHG | TEMPERATURE: 98 F | HEART RATE: 84 BPM | RESPIRATION RATE: 16 BRPM

## 2017-11-25 DIAGNOSIS — Z90.5 ACQUIRED ABSENCE OF KIDNEY: Chronic | ICD-10-CM

## 2017-11-25 LAB
ALBUMIN SERPL ELPH-MCNC: 4.2 G/DL — SIGNIFICANT CHANGE UP (ref 3.3–5)
ALP SERPL-CCNC: 50 U/L — SIGNIFICANT CHANGE UP (ref 40–120)
ALT FLD-CCNC: 6 U/L — SIGNIFICANT CHANGE UP (ref 4–33)
AST SERPL-CCNC: 16 U/L — SIGNIFICANT CHANGE UP (ref 4–32)
BASOPHILS # BLD AUTO: 0.02 K/UL — SIGNIFICANT CHANGE UP (ref 0–0.2)
BASOPHILS NFR BLD AUTO: 0.3 % — SIGNIFICANT CHANGE UP (ref 0–2)
BILIRUB SERPL-MCNC: 0.3 MG/DL — SIGNIFICANT CHANGE UP (ref 0.2–1.2)
BUN SERPL-MCNC: 14 MG/DL — SIGNIFICANT CHANGE UP (ref 7–23)
CA-I BLD-SCNC: 1.22 MMOL/L — SIGNIFICANT CHANGE UP (ref 1.03–1.23)
CALCIUM SERPL-MCNC: 9.4 MG/DL — SIGNIFICANT CHANGE UP (ref 8.4–10.5)
CHLORIDE SERPL-SCNC: 104 MMOL/L — SIGNIFICANT CHANGE UP (ref 98–107)
CO2 SERPL-SCNC: 26 MMOL/L — SIGNIFICANT CHANGE UP (ref 22–31)
CREAT SERPL-MCNC: 0.96 MG/DL — SIGNIFICANT CHANGE UP (ref 0.5–1.3)
EOSINOPHIL # BLD AUTO: 0.03 K/UL — SIGNIFICANT CHANGE UP (ref 0–0.5)
EOSINOPHIL NFR BLD AUTO: 0.4 % — SIGNIFICANT CHANGE UP (ref 0–6)
GLUCOSE SERPL-MCNC: 152 MG/DL — HIGH (ref 70–99)
HCT VFR BLD CALC: 40.4 % — SIGNIFICANT CHANGE UP (ref 34.5–45)
HGB BLD-MCNC: 12.9 G/DL — SIGNIFICANT CHANGE UP (ref 11.5–15.5)
IMM GRANULOCYTES # BLD AUTO: 0.02 # — SIGNIFICANT CHANGE UP
IMM GRANULOCYTES NFR BLD AUTO: 0.3 % — SIGNIFICANT CHANGE UP (ref 0–1.5)
LYMPHOCYTES # BLD AUTO: 1.54 K/UL — SIGNIFICANT CHANGE UP (ref 1–3.3)
LYMPHOCYTES # BLD AUTO: 22.3 % — SIGNIFICANT CHANGE UP (ref 13–44)
MCHC RBC-ENTMCNC: 31.9 % — LOW (ref 32–36)
MCHC RBC-ENTMCNC: 32 PG — SIGNIFICANT CHANGE UP (ref 27–34)
MCV RBC AUTO: 100.2 FL — HIGH (ref 80–100)
MONOCYTES # BLD AUTO: 0.56 K/UL — SIGNIFICANT CHANGE UP (ref 0–0.9)
MONOCYTES NFR BLD AUTO: 8.1 % — SIGNIFICANT CHANGE UP (ref 2–14)
NEUTROPHILS # BLD AUTO: 4.73 K/UL — SIGNIFICANT CHANGE UP (ref 1.8–7.4)
NEUTROPHILS NFR BLD AUTO: 68.6 % — SIGNIFICANT CHANGE UP (ref 43–77)
NRBC # FLD: 0 — SIGNIFICANT CHANGE UP
PLATELET # BLD AUTO: 238 K/UL — SIGNIFICANT CHANGE UP (ref 150–400)
PMV BLD: 10.8 FL — SIGNIFICANT CHANGE UP (ref 7–13)
POTASSIUM SERPL-MCNC: 4.9 MMOL/L — SIGNIFICANT CHANGE UP (ref 3.5–5.3)
POTASSIUM SERPL-SCNC: 4.9 MMOL/L — SIGNIFICANT CHANGE UP (ref 3.5–5.3)
PROT SERPL-MCNC: 8.3 G/DL — SIGNIFICANT CHANGE UP (ref 6–8.3)
RBC # BLD: 4.03 M/UL — SIGNIFICANT CHANGE UP (ref 3.8–5.2)
RBC # FLD: 14.1 % — SIGNIFICANT CHANGE UP (ref 10.3–14.5)
SODIUM SERPL-SCNC: 145 MMOL/L — SIGNIFICANT CHANGE UP (ref 135–145)
WBC # BLD: 6.9 K/UL — SIGNIFICANT CHANGE UP (ref 3.8–10.5)
WBC # FLD AUTO: 6.9 K/UL — SIGNIFICANT CHANGE UP (ref 3.8–10.5)

## 2017-11-25 PROCEDURE — 99284 EMERGENCY DEPT VISIT MOD MDM: CPT | Mod: GC

## 2017-11-25 RX ORDER — HYDROCHLOROTHIAZIDE 25 MG
25 TABLET ORAL ONCE
Qty: 0 | Refills: 0 | Status: COMPLETED | OUTPATIENT
Start: 2017-11-25 | End: 2017-11-25

## 2017-11-25 RX ORDER — LISINOPRIL 2.5 MG/1
20 TABLET ORAL ONCE
Qty: 0 | Refills: 0 | Status: COMPLETED | OUTPATIENT
Start: 2017-11-25 | End: 2017-11-25

## 2017-11-25 RX ORDER — SODIUM CHLORIDE 9 MG/ML
500 INJECTION INTRAMUSCULAR; INTRAVENOUS; SUBCUTANEOUS ONCE
Qty: 0 | Refills: 0 | Status: COMPLETED | OUTPATIENT
Start: 2017-11-25 | End: 2017-11-25

## 2017-11-25 RX ADMIN — Medication 25 MILLIGRAM(S): at 14:29

## 2017-11-25 RX ADMIN — LISINOPRIL 20 MILLIGRAM(S): 2.5 TABLET ORAL at 14:29

## 2017-11-25 RX ADMIN — SODIUM CHLORIDE 500 MILLILITER(S): 9 INJECTION INTRAMUSCULAR; INTRAVENOUS; SUBCUTANEOUS at 14:07

## 2017-11-25 NOTE — ED PROVIDER NOTE - OBJECTIVE STATEMENT
88yo F pmh thyroiditis s/p thyroidectomy x6mo, htn, dm, chronic intermittent dysphagia p/w dysphagia, worsening over past 2days. pt hadnt been able to eat, drink or swallow saliva or mediaction for 2days. pt now drinking water w/o issues in ED. this has happened 4times, is scheduled for outpt esphagram 11/28 with GI dr vicente. Denies f/c, CP, SOB, N/V/D, abdominal pain, dysuria, odynophagia, hx cancer, hoarseness, stridor

## 2017-11-25 NOTE — ED PROVIDER NOTE - PROGRESS NOTE DETAILS
roseanne: PT seen and reassessed.  Patient symptomatically improved.   AAOX3, NAD, VSS.  Discussed test results w/ patient. Patient verbalized understanding of hospital course and outpatient plans, has decisional making capacity.  Will f/u w/ pmd in the next few days; patient will call for an appointment. Will return to the ED if there is any worsening of symptoms.  Patient able to ambulate at baseline, is tolerating PO intake

## 2017-11-25 NOTE — ED PROVIDER NOTE - ATTENDING CONTRIBUTION TO CARE
86 yo F pmhx HTN, DM, thyroiditis s/p thyroidectomy x 6mo, chronic intermittent dysphagia p/w dysphagia, worsening over past 2days which has resolved while in the ED. Pt is drinking water in the ED and is scheduled for outpt esphagram 11/28 with GI dr vicente.   -well appearing, unchanged ecg, asymptomatic and stable for d/c  -will decrease her BP to <200 systolic as pt is hypertensive in the ED 2/2 to not taking her BP meds this morning.  I performed a history and physical exam of the patient and discussed their management with the resident. I reviewed the resident's note and agree with the documented findings and plan of care. My medical decision making and observations are found above.

## 2017-11-25 NOTE — ED PROVIDER NOTE - MEDICAL DECISION MAKING DETAILS
-initial impression: symptoms possibly due intermittent esophageal spasm vs calcium tetany. no concerns for thyroid invasion, abscess, airway compromise. pt clearing secretions well.  -initial plan: labs, IVF, home BP meds -consider dc home w/ scheduled for outpt esphagram 11/28 with GI dr vicente

## 2017-11-25 NOTE — ED ADULT TRIAGE NOTE - CHIEF COMPLAINT QUOTE
c/o difficulty swallowing since yesterday morning . Pt had Thyroid surgery June 2017 and since then has been having intermittent difficulty swallowing that subsides on its own. Pt had same episode 2 months ago and family states was seen by PCP who scheduled pt for sonogram of throat. Pt denies pain, denies difficulty breathing. Pt spitting up in tissue in triage. Pt unable to swallow BP pill today. BP elevated 207/98

## 2017-11-25 NOTE — ED ADULT NURSE NOTE - OBJECTIVE STATEMENT
Receive pt in spot 9 alert and oriented x 3 s/p thyroid surgery 6/17 c/o difficulty swallowing intermittently.  Denies chest pain, sob, dizziness, cough .Family states, pt was seen in ED for the same complaints, that resolved while she was in the ED.  No drooling, stridor noted.  Skin intact

## 2020-02-03 NOTE — ED PROVIDER NOTE - PSH
GLYCEMIC CONTROL PROGRESS NOTE:    - known h/o ESRD HD, T2DM, HbA1C ordered per protocol, no PTA DM meds listed  Noted:  - pt with 4 hypoglycemic episodes since admission, r/t NPO status & poor PO intake, hypoglycemia protocol followed  - hypoglycemia pattern at bedtime and early morning  - nutritional supplements ordered per Clinical RN recommendation (encourage prior to bedtime), D5 in IVF per Hospitalist    Recent Glucose Results:   Lab Results   Component Value Date/Time    GLU 41 (LL) 02/03/2020 04:39 AM    GLUCPOC 149 (H) 02/03/2020 11:31 AM    GLUCPOC 79 02/03/2020 09:01 AM    GLUCPOC 95 02/03/2020 07:39 AM     Ethan Lee MS, RN, CDE  Glycemic Control Team  [phone]  Pager [phone] (M-TH 8:00-4:30P)  *After Hours pager [phone]
History of nephrectomy  left, > 20 years ago

## 2021-06-30 NOTE — ED ADULT NURSE NOTE - PAIN RATING/NUMBER SCALE (0-10): REST
General Surgery  Interval Note:    Kely Landry currently requires the use of a negative pressure wound therapy device for aid in healing of her midline abdominal wound. The patient was seen at the bedside for her wound vac exchange. The vacuum device was switched off, the wound dressing was taken down, and the black sponge was removed. The wound was then measured at 20 cm x 5.5 cm, approximately 3 cm deep. The wound appeared to be healing well pink granulating tissue at the base (pictured below). The sponge dressing was reconstructed and placed into the wound space, and the supplied adhesive dressing was placed on top. Negative pressure at -125 was applied via the vacuum device, and it was ensured that no air leaks existed at this time. We will tentatively plan to change her dressing again on Friday, 7/2. Laura Alan MD  PGY1, General Surgery  06/30/21  12:53 PM  [phone]

## 2022-07-07 NOTE — ASU DISCHARGE PLAN (ADULT/PEDIATRIC). - ITEMS TO FOLLOWUP WITH YOUR PHYSICIAN'S
Please keep your follow up appointment with Dr. Tobias Cox (cardiology) will contact you today from his office to schedule a visit and appropriate testing. No

## 2023-01-10 NOTE — ASU PATIENT PROFILE, ADULT - AS SC BRADEN NUTRITION
(3) adequate Itraconazole Counseling:  I discussed with the patient the risks of itraconazole including but not limited to liver damage, nausea/vomiting, neuropathy, and severe allergy.  The patient understands that this medication is best absorbed when taken with acidic beverages such as non-diet cola or ginger ale.  The patient understands that monitoring is required including baseline LFTs and repeat LFTs at intervals.  The patient understands that they are to contact us or the primary physician if concerning signs are noted.